# Patient Record
Sex: MALE | Race: OTHER | HISPANIC OR LATINO | URBAN - METROPOLITAN AREA
[De-identification: names, ages, dates, MRNs, and addresses within clinical notes are randomized per-mention and may not be internally consistent; named-entity substitution may affect disease eponyms.]

---

## 2017-10-01 ENCOUNTER — EMERGENCY (EMERGENCY)
Facility: HOSPITAL | Age: 47
LOS: 1 days | Discharge: HOME | End: 2017-10-01
Attending: EMERGENCY MEDICINE
Payer: COMMERCIAL

## 2017-10-01 VITALS
SYSTOLIC BLOOD PRESSURE: 148 MMHG | DIASTOLIC BLOOD PRESSURE: 83 MMHG | OXYGEN SATURATION: 100 % | HEART RATE: 89 BPM | RESPIRATION RATE: 18 BRPM | HEIGHT: 71 IN | WEIGHT: 149.91 LBS | TEMPERATURE: 98 F

## 2017-10-01 VITALS
RESPIRATION RATE: 18 BRPM | TEMPERATURE: 99 F | SYSTOLIC BLOOD PRESSURE: 140 MMHG | HEART RATE: 85 BPM | DIASTOLIC BLOOD PRESSURE: 68 MMHG | OXYGEN SATURATION: 99 %

## 2017-10-01 DIAGNOSIS — N30.91 CYSTITIS, UNSPECIFIED WITH HEMATURIA: ICD-10-CM

## 2017-10-01 LAB
APPEARANCE UR: ABNORMAL
BILIRUB UR-MCNC: NEGATIVE — SIGNIFICANT CHANGE UP
COLOR SPEC: ABNORMAL
DIFF PNL FLD: ABNORMAL
GLUCOSE UR QL: NEGATIVE — SIGNIFICANT CHANGE UP
KETONES UR-MCNC: NEGATIVE — SIGNIFICANT CHANGE UP
LEUKOCYTE ESTERASE UR-ACNC: ABNORMAL
NITRITE UR-MCNC: POSITIVE
PH UR: 7 — SIGNIFICANT CHANGE UP (ref 5–8)
PROT UR-MCNC: 100
SP GR SPEC: 1 — LOW (ref 1.01–1.02)
UROBILINOGEN FLD QL: NEGATIVE — SIGNIFICANT CHANGE UP

## 2017-10-01 PROCEDURE — 81001 URINALYSIS AUTO W/SCOPE: CPT

## 2017-10-01 PROCEDURE — 99283 EMERGENCY DEPT VISIT LOW MDM: CPT

## 2017-10-01 PROCEDURE — 87186 SC STD MICRODIL/AGAR DIL: CPT

## 2017-10-01 PROCEDURE — 99284 EMERGENCY DEPT VISIT MOD MDM: CPT

## 2017-10-01 PROCEDURE — 87086 URINE CULTURE/COLONY COUNT: CPT

## 2017-10-01 RX ORDER — CEFUROXIME AXETIL 250 MG
500 TABLET ORAL ONCE
Qty: 0 | Refills: 0 | Status: COMPLETED | OUTPATIENT
Start: 2017-10-01 | End: 2017-10-01

## 2017-10-01 RX ORDER — CEFUROXIME AXETIL 250 MG
1 TABLET ORAL
Qty: 14 | Refills: 0 | OUTPATIENT
Start: 2017-10-01 | End: 2017-10-08

## 2017-10-01 RX ADMIN — Medication 500 MILLIGRAM(S): at 18:21

## 2017-10-01 NOTE — ED PROVIDER NOTE - PHYSICAL EXAMINATION
+Suprapubic discomfort, no gurading, nondistended. No flank tenderness. Not testicular pain, cremasteric reflexes intact,  uncircumcised, no penile discharge. +Suprapubic discomfort, no guarding, nondistended. No flank tenderness. Not testicular pain, cremasteric reflexes intact,  uncircumcised, no penile discharge.

## 2017-10-01 NOTE — ED PROVIDER NOTE - MEDICAL DECISION MAKING DETAILS
Pt has no fever, no vomiting, no flank pain. Pt able to urinate, denies urinary retention. Pt adamantly denies STD risk factors. Rx Ceftin and f/u with urologist- contact provided. Pt is well appearing walking with normal gait, stable for discharge and follow up with medical doctor. Pt educated on care and need for follow up. Discussed anticipatory guidance and return precautions. Questions answered. I had a detailed discussion with the patient and/or guardian regarding the historical points, exam findings, and any diagnostic results supporting the discharge diagnosis.

## 2017-10-01 NOTE — ED ADULT NURSE NOTE - OBJECTIVE STATEMENT
Pt states that he has been having trouble urinating since wed, today he found blood in the urine. co headache. denies discharge in penis. denies fever and chills

## 2017-10-01 NOTE — ED PROVIDER NOTE - OBJECTIVE STATEMENT
+Suprapubic tenderness and hematuria starting 3 hours prior to ED arrival. No vomiting,, no reported fever. No trauma.

## 2017-10-04 NOTE — ED ADULT TRIAGE NOTE - AS TEMP SITE
SUBJECTIVE:   Lexy Ahn is a 49 year old female who presents to clinic today for the following health issues:      Pt would like to be checked for lyme's diease.    Last night she picked something off her wrist, wasn't sure if it was a tic. If it was a tic, uncertain how long it would've been there.    She does walk her dog in the woods every morning.    ROS  No fever  No aches  No rash noted  No headache    EXAM:  /70  Pulse 67  Temp 98.2  F (36.8  C) (Oral)  Resp 16  Wt 141 lb 12.8 oz (64.3 kg)  LMP 08/27/2017 (Approximate)  BMI 22.21 kg/m2  Constitutional: Healthy, alert, no distress   Cardiovascular: RRR. No murmurs   Respiratory: Clear to auscultation   Skin: 3mm ecchymosis on left wrist, extensor surface    ASSESSMENT    ICD-10-CM    1. Risk of exposure to Lyme disease Z91.89 doxycycline (VIBRAMYCIN) 100 MG capsule      Plan:  Recommending preventive treatment given 24 hour timeline from suspected exposure.  If symptoms develop, contact PCP or our office to consider appropriate next step in evaluation.    Curt Adkins MD  Family Medicine Physician        oral